# Patient Record
Sex: MALE | Race: WHITE | NOT HISPANIC OR LATINO | Employment: OTHER | URBAN - METROPOLITAN AREA
[De-identification: names, ages, dates, MRNs, and addresses within clinical notes are randomized per-mention and may not be internally consistent; named-entity substitution may affect disease eponyms.]

---

## 2017-01-05 ENCOUNTER — TELEPHONE (OUTPATIENT)
Dept: GASTROENTEROLOGY | Facility: CLINIC | Age: 52
End: 2017-01-05

## 2017-01-05 NOTE — TELEPHONE ENCOUNTER
----- Message from Ritesh Voss MD sent at 1/5/2017 10:34 AM CST -----  Please have pt come in for his blood work (LFT's and PT/INR) either today or tomorrow (looks like we ordered it for this week but pt did not have it done yet).

## 2017-01-06 ENCOUNTER — TELEPHONE (OUTPATIENT)
Dept: GASTROENTEROLOGY | Facility: CLINIC | Age: 52
End: 2017-01-06

## 2017-01-06 NOTE — TELEPHONE ENCOUNTER
S/w pt he is going in today for blood work. He stated he is feeling wonderful. He does have an appt with liver specialist on Monday.

## 2017-01-06 NOTE — TELEPHONE ENCOUNTER
----- Message from Marcela Eckert sent at 1/6/2017 12:21 PM CST -----  Contact: self  Patient needs outside orders [facility] for blood work. Please call patient at [phone] when sent. Thanks!

## 2017-01-09 ENCOUNTER — OFFICE VISIT (OUTPATIENT)
Dept: HEPATOLOGY | Facility: CLINIC | Age: 52
End: 2017-01-09
Payer: COMMERCIAL

## 2017-01-09 VITALS
DIASTOLIC BLOOD PRESSURE: 87 MMHG | HEIGHT: 72 IN | SYSTOLIC BLOOD PRESSURE: 153 MMHG | WEIGHT: 202 LBS | OXYGEN SATURATION: 99 % | TEMPERATURE: 98 F | RESPIRATION RATE: 20 BRPM | HEART RATE: 103 BPM | BODY MASS INDEX: 27.36 KG/M2

## 2017-01-09 DIAGNOSIS — L65.9 HAIR LOSS: ICD-10-CM

## 2017-01-09 DIAGNOSIS — B17.9 ACUTE HEPATITIS: ICD-10-CM

## 2017-01-09 DIAGNOSIS — R79.89 ELEVATED LFTS: Primary | ICD-10-CM

## 2017-01-09 PROCEDURE — 1159F MED LIST DOCD IN RCRD: CPT | Mod: S$GLB,,, | Performed by: NURSE PRACTITIONER

## 2017-01-09 PROCEDURE — 99203 OFFICE O/P NEW LOW 30 MIN: CPT | Mod: S$GLB,,, | Performed by: NURSE PRACTITIONER

## 2017-01-09 PROCEDURE — 99999 PR PBB SHADOW E&M-EST. PATIENT-LVL III: CPT | Mod: PBBFAC,,, | Performed by: NURSE PRACTITIONER

## 2017-01-09 NOTE — LETTER
January 9, 2017      Ritesh Voss MD  [address] [facility] [address] [place] [address]           [facility] - Hepatology  [address]  [place] [address]  Phone: [phone]  Fax: [phone]          Patient: Rowdy Malone   MR Number: [id number]   YOB: 1965   Date of Visit: 1/9/2017       Dear Dr. Ritesh Voss:    Thank you for referring Rowdy Malone to me for evaluation. Attached you will find relevant portions of my assessment and plan of care.    If you have questions, please do not hesitate to call me. I look forward to following Rowdy Malone along with you.    Sincerely,    Aurora Mathews, CHETAN    Enclosure  CC:  No Recipients    If you would like to receive this communication electronically, please contact [email or web address] or [phone] to request more information on [employer] Link access.    For providers and/or their staff who would like to refer a patient to [facility], please contact us through our one-stop-shop provider referral line, [facility] , at [phone].    If you feel you have received this communication in error or would no longer like to receive these types of communications, please e-mail [email or web address]
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
## 2017-01-09 NOTE — MR AVS SNAPSHOT
[facility] - Hepatology  [address]  [place] [address]  Phone: [phone]  Fax: [phone]                  Rowdy Malone   2017 11:20 AM   Office Visit    Description:  Male : 1965   Provider:  Aurora Mathews NP   Department:  [facility] - Hepatology           Reason for Visit     Acute hepatitis           Diagnoses this Visit        Comments    Elevated LFTs    -  Primary     Hair loss                To Do List           Goals (5 Years of Data)     None      [facility]     [facility] On [facility] Nurse Care Line -  Assistance  Registered nurses in the [facility] Call Center provide clinical advisement, health education, appointment booking, and other advisory services.  Call for this free service at [phone].             Medications           Message regarding Medications     Verify the changes and/or additions to your medication regime listed below are the same as discussed with your clinician today.  If any of these changes or additions are incorrect, please notify your healthcare provider.        STOP taking these medications     hydrOXYzine HCl (ATARAX) 25 MG tablet Take 1 tablet (25 mg total) by mouth 4 (four) times daily as needed for Itching.           Verify that the below list of medications is an accurate representation of the medications you are currently taking.  If none reported, the list may be blank. If incorrect, please contact your healthcare provider. Carry this list with you in case of emergency.           Current Medications            Clinical Reference Information           Vital Signs - Last Recorded  Most recent update: 2017 11:30 AM by Priscilla Charlton MA    BP Pulse Temp Resp Ht Wt    (!) 153/87 (BP Location: Left arm, Patient Position: Sitting, BP Method: Automatic) 103 98.4 °F (36.9 °C) (Oral) 20 6' (1.829 m) 91.6 kg (202 lb)    SpO2 BMI             99% 27.4 kg/m2         Blood Pressure          Most Recent Value    BP  (!)  153/87       Allergies as of 1/9/2017     Morphine    Penicillins      Immunizations Administered on Date of Encounter - 1/9/2017     None      Orders Placed During Today's Visit     Future Labs/Procedures Expected by Expires    T4, free  As directed 1/9/2018    TSH  As directed 1/9/2018    Recurring Lab Work Interval Expires    Hepatic function panel   3/10/2018

## 2018-03-28 ENCOUNTER — TELEPHONE (OUTPATIENT)
Dept: HEPATOLOGY | Facility: CLINIC | Age: 53
End: 2018-03-28

## 2018-03-28 DIAGNOSIS — R79.89 ELEVATED LFTS: Primary | ICD-10-CM

## 2018-03-28 NOTE — TELEPHONE ENCOUNTER
Pt never did labs to ensure lft's normalized. I would need to have lab done to see this before I write letter. Please schedule lab for hepatic panel.

## 2018-03-28 NOTE — TELEPHONE ENCOUNTER
----- Message from Alesha Casanova sent at 3/28/2018 11:28 AM CDT -----  Contact: pt  Pt calling to request letter that he was released and is in good health and no longer has complications from hepatitis or encephalitis for health/life insurance purposes.    pls contact pt once this is done/very important    Pt contact [phone]  Pt fax [phone]

## 2018-03-29 NOTE — TELEPHONE ENCOUNTER
MA attempted to call patient to schedule his labs. He is unable to reached left him  to please give us a callback.

## 2018-04-12 ENCOUNTER — PATIENT MESSAGE (OUTPATIENT)
Dept: HEPATOLOGY | Facility: CLINIC | Age: 53
End: 2018-04-12

## 2018-04-17 ENCOUNTER — TELEPHONE (OUTPATIENT)
Dept: HEPATOLOGY | Facility: CLINIC | Age: 53
End: 2018-04-17

## 2018-04-17 DIAGNOSIS — R74.8 ELEVATED LIVER ENZYMES: Primary | ICD-10-CM

## 2018-04-18 NOTE — TELEPHONE ENCOUNTER
Spoke to pt. Explained need to have another lab done. Pt agreed to go do lab again. Discussed Tbili and Dbili were the same value. Need to clarify this. If remains stable, I can compose letter for his insurance. Pt verbalized understanding.     Please call him to schedule hepatic panel again.

## 2018-04-18 NOTE — TELEPHONE ENCOUNTER
MA called patient relay lab results below he understood stated that he need the letter ASAP! He had several labs already he is questioning why he have to repeat it again in 4 weeks.     He said that this is for HEALTH insurance not LIFE insurance. He said that the health insurance he have right now is not good he need the letter to get a better health insurance.     Route message to NP

## 2018-04-19 NOTE — TELEPHONE ENCOUNTER
MA called patient schedule his labs at [facility]. Mailed appt reminder to patient.

## 2018-11-21 PROBLEM — N20.1 CALCULI, URETER: Status: ACTIVE | Noted: 2018-11-21

## 2019-03-06 PROBLEM — N20.0 NEPHROLITHIASIS: Status: ACTIVE | Noted: 2019-03-06

## 2019-03-06 PROBLEM — R94.111: Status: ACTIVE | Noted: 2019-03-06

## 2019-11-18 PROBLEM — M51.9 LUMBAR DISC DISEASE: Status: ACTIVE | Noted: 2019-11-18

## 2021-05-10 ENCOUNTER — PATIENT MESSAGE (OUTPATIENT)
Dept: RESEARCH | Facility: HOSPITAL | Age: 56
End: 2021-05-10

## 2022-06-20 ENCOUNTER — TELEPHONE (OUTPATIENT)
Dept: UROLOGY | Facility: CLINIC | Age: 57
End: 2022-06-20

## 2022-06-20 NOTE — TELEPHONE ENCOUNTER
----- Message from Migdalia Villarreal RN sent at 6/18/2022  1:06 PM CDT -----  Regarding: ED Follow Up  Hello,     This patient was seen in the ED on 6/16/22 for renal colic and needs urology follow up. Please contact the patient directly for scheduling. Thanks.     Kind Regards,     Migdalia Villarreal, LEONORA, RN  [facility] ED Navigator  [phone]

## 2023-12-07 NOTE — TELEPHONE ENCOUNTER
Please call pt as he has not read my [facility] msg I sent him last week:    It looks like your liver enzymes are back to normal now as I expected they would be. I did need labs to verify that though. Can you clarify the msg you sent my staff. You need a letter that states this for your life insurance? Please let me know so I can get that to you.     I would like to do one more lab in 4 weeks again just to ensure they remain normal as well.        Please schedule lab for hepatic panel in 4 weeks.   No